# Patient Record
Sex: FEMALE | URBAN - METROPOLITAN AREA
[De-identification: names, ages, dates, MRNs, and addresses within clinical notes are randomized per-mention and may not be internally consistent; named-entity substitution may affect disease eponyms.]

---

## 2021-08-17 ENCOUNTER — OFFICE VISIT (OUTPATIENT)
Dept: SURGERY | Facility: CLINIC | Age: 36
End: 2021-08-17
Payer: MEDICAID

## 2021-08-17 VITALS
WEIGHT: 150 LBS | BODY MASS INDEX: 25.61 KG/M2 | DIASTOLIC BLOOD PRESSURE: 84 MMHG | HEART RATE: 78 BPM | HEIGHT: 64 IN | TEMPERATURE: 98 F | SYSTOLIC BLOOD PRESSURE: 124 MMHG

## 2021-08-17 DIAGNOSIS — K43.9 VENTRAL HERNIA WITHOUT OBSTRUCTION OR GANGRENE: Primary | ICD-10-CM

## 2021-08-17 DIAGNOSIS — M62.08 DIASTASIS RECTI: ICD-10-CM

## 2021-08-17 PROCEDURE — 99245 OFF/OP CONSLTJ NEW/EST HI 55: CPT | Performed by: SURGERY

## 2021-08-17 PROCEDURE — 3074F SYST BP LT 130 MM HG: CPT | Performed by: SURGERY

## 2021-08-17 PROCEDURE — 3079F DIAST BP 80-89 MM HG: CPT | Performed by: SURGERY

## 2021-08-17 PROCEDURE — 3008F BODY MASS INDEX DOCD: CPT | Performed by: SURGERY

## 2021-08-17 NOTE — H&P
New Patient Visit Note       Active Problems      1. Ventral hernia without obstruction or gangrene    2. Diastasis recti        Chief Complaint   Patient presents with:  Hernia: NP - hernia and diastasis recti (ref.  Dr. Eri Simmons)       Allergies  Judith Busman Normocephalic and atraumatic. Eyes:      Pupils: Pupils are equal, round, and reactive to light. Cardiovascular:      Rate and Rhythm: Normal rate and regular rhythm.    Pulmonary:      Effort: Pulmonary effort is normal.      Breath sounds: Normal darling Repeat CT scan at [facility] July 16, 2020 reveals stable ventral hernia.     Past medical history - none    Past sugicl histoy - none    No blood thinners     On physical examination the patient does have a protuberant with a ventral layo

## 2021-08-25 ENCOUNTER — OFFICE VISIT (OUTPATIENT)
Dept: SURGERY | Facility: CLINIC | Age: 36
End: 2021-08-25
Payer: MEDICAID

## 2021-08-25 VITALS — HEIGHT: 64 IN | BODY MASS INDEX: 25.61 KG/M2 | WEIGHT: 150 LBS

## 2021-08-25 DIAGNOSIS — K43.9 VENTRAL HERNIA WITHOUT OBSTRUCTION OR GANGRENE: Primary | ICD-10-CM

## 2021-08-25 PROCEDURE — 3008F BODY MASS INDEX DOCD: CPT | Performed by: SURGERY

## 2021-08-25 PROCEDURE — 99244 OFF/OP CNSLTJ NEW/EST MOD 40: CPT | Performed by: SURGERY

## 2021-08-25 NOTE — H&P
Chief complaint: Patient presents with:  Hernia: Referred by Dr. Farrah Singh for 2nd opinion ventral hernia/diastasis. HPI: Anthony Pride presents for ventral hernia repair. She has an enlarging ventral/epigastric hernia which contains the transverse colon.  She fe noted  Neck/Thyroid: neck is supple without adenopathy, no thyromegaly, no JVD, no carotid bruits. Respiratory: normal to inspection, lungs are clear to auscultation bilaterally, normal respiratory effort, no wheezing. Cardiovascular: regular rate.   Ardeth Cesar

## (undated) NOTE — LETTER
21    Patient: Tg Mcconnell  : 1985 Visit date: 2021    Dear  Vamshi Gonzales    Thank you for referring Tg Mcconnell to my practice. Please find my assessment and plan below.        History of Present Illness     39year old female contact information was given to the patient at this time. She will follow up with this office when she is ready for surgical scheduling      Assessment   Ventral hernia without obstruction or gangrene  (primary encounter diagnosis)  Diastasis recti  .